# Patient Record
Sex: MALE | Race: WHITE | NOT HISPANIC OR LATINO | Employment: FULL TIME | URBAN - NONMETROPOLITAN AREA
[De-identification: names, ages, dates, MRNs, and addresses within clinical notes are randomized per-mention and may not be internally consistent; named-entity substitution may affect disease eponyms.]

---

## 2022-07-11 ENCOUNTER — OFFICE VISIT (OUTPATIENT)
Dept: UROLOGY | Facility: CLINIC | Age: 47
End: 2022-07-11

## 2022-07-11 VITALS
RESPIRATION RATE: 19 BRPM | BODY MASS INDEX: 27.3 KG/M2 | TEMPERATURE: 97.7 F | HEIGHT: 71 IN | HEART RATE: 68 BPM | DIASTOLIC BLOOD PRESSURE: 76 MMHG | OXYGEN SATURATION: 98 % | WEIGHT: 195 LBS | SYSTOLIC BLOOD PRESSURE: 122 MMHG

## 2022-07-11 DIAGNOSIS — Z30.09 VASECTOMY EVALUATION: Primary | ICD-10-CM

## 2022-07-11 PROCEDURE — 99203 OFFICE O/P NEW LOW 30 MIN: CPT | Performed by: UROLOGY

## 2022-07-11 NOTE — PROGRESS NOTES
"Chief Complaint  Elective sterilization    Referring provider  Referring, Self    HPI  Luis E Adiel is a 47 y.o. male who presents with desire for irreversible, elective sterilization.    He has 2 kids.    His wife is supportive of this decision.        Past Medical History  Past Medical History:   Diagnosis Date   • Hyperlipidemia        Past Surgical History  No past surgical history on file.    Medications  No current outpatient medications on file.    Allergies  No Known Allergies    Social History  Social History     Socioeconomic History   • Marital status:    Tobacco Use   • Smoking status: Never Smoker   • Smokeless tobacco: Never Used   Substance and Sexual Activity   • Alcohol use: Never   • Drug use: Never   • Sexual activity: Defer       Family History  He has no family history of prostate cancer    Review of Systems  Constitutional: Negative for fever, chills, weight loss, weight gain and malaise/fatigue.   Skin: Negative for rash.   Eyes: Negative for blurred vision.   Endocrine: No heat/cold intolerance   Cardiovascular: Negative for chest pain or dyspnea on exertion.  Respiratory: Negative for shortness of breath or wheezing.   Gastrointestinal: Negative for nausea, abdominal pain, diarrhea, constipation.   Genitourinary: Negative for new lower urinary tract symptoms, current gross hematuria or dysuria.  Musculoskeletal: Negative for back pain and joint pain.   Lymph/Heme: Negative for easily bruises / bleeds.     Physical Exam  Visit Vitals  /76   Pulse 68   Temp 97.7 °F (36.5 °C)   Resp 19   Ht 180.3 cm (71\")   Wt 88.5 kg (195 lb)   SpO2 98%   BMI 27.20 kg/m²     Constitutional: NAD, WDWN.   HEENT: NCAT. Conjunctivae normal.  MMM.    Cardiovascular: Regular rate.  Pulmonary/Chest: Respirations are even and non-labored bilaterally.  Abdominal: Soft. No distension, tenderness, masses or guarding. No CVA tenderness.  Neurological: A + O x 3.  Cranial Nerves II-XII grossly intact. Normal " gait.  Extremities: ANUM x 4, Warm. No clubbing.  No cyanosis.    Skin: Pink, warm and dry.  No rashes noted.  Psychiatric:  Normal mood and affect  Genitourinary  Penis: circumcised penis, glans normal, no penile discharge.  No rashes/lesions.    Testes: descended bilaterally, no masses, nontender to palpation. Remainder of scrotal contents normal. No hernia appreciated.  Bilateral vasa palpable    Assessment and Plan  Mr. Chun is a 47 y.o. male who presents today requesting permanent, irreversible surgical sterilization.  He was instructed to trim his pubic hair the night prior with a clippers.  The vasectomy was discussed in detail with the patient today including the risks which are failure of the procedure, infection, bleeding, development of chronic testicular pain and the possibility of injuring adjacent structures which could potentially result in loss of the testicle.  Furthermore, the patient was told he would remain fertile following the procedure until he provided a semen analysis that showed no sperm.  The patient expressed understanding and desire to proceed.      He will be scheduled for vasectomy with nitrous at his convenience. He was given a prescription for diclofenac to take 1 hr beforehand.     I spent a total of 30 minutes in total patient care, which involved direct interaction, examination, chart review, and discussion of treatment options.

## 2022-10-21 ENCOUNTER — PROCEDURE VISIT (OUTPATIENT)
Dept: UROLOGY | Facility: CLINIC | Age: 47
End: 2022-10-21

## 2022-10-21 DIAGNOSIS — Z30.2 ENCOUNTER FOR VASECTOMY: Primary | ICD-10-CM

## 2022-10-21 PROCEDURE — 55250 REMOVAL OF SPERM DUCT(S): CPT | Performed by: UROLOGY

## 2022-10-21 NOTE — PROGRESS NOTES
Preoperative diagnosis  Elective sterilization    Postoperative diagnosis  Elective sterilization    Procedure performed  Bilateral vasectomy    Surgeon  Garry Leone MD    Anesthesia  5 mL lidocaine 2% local injection  50% nitrous oxide /oxygen mixture    Complications  None    EBL  2 mL    Specimen  Left vas  Right vas    Indications  47 y.o. male agreed to undergo the above named procedure after discussion of the alternatives, risks and benefits.  Informed consent was obtained.      Procedure  The patient was brought to the procedure room and placed in supine position.  His scrotum was prepped with Hibiclens and he was draped in a sterile fashion.  A timeout was performed.  The patient was given inhaled self-administered nitrous oxide for anesthesia.    Lidocaine 2% was used to anesthetize the scrotal skin as well as perivasal sheaths using a high-pressure jet injector. A 1 cm skin incision was created with the sharp-tip mosquito and a vas clamp utilized through this incision to grasp the vas.  The left vas was elevated to the skin surface and dissected free of its perivasal sheath.  The vas was transected and the lumen was cauterized both proximally and distally.  A 4-0 chromic suture was utilized to place the proximal vasal portion under the perivasal sheath, such that the 2 ends were divided by the perivasal sheath (fascial interposition).  This portion of the vas was then allowed to drop back into the left hemiscrotum.  The right side was treated next in the same manner as described above.  The skin incision was closed with the 4-0 chromic suture.  The patient tolerated the procedure well.    It was reiterated to the patient that he would remain fertile for sometime and should present to the office for a post-vacectomy semen analysis to confirm sterility.  We will check a semen analysis in approximately 2 months. The patient expressed understanding.

## 2022-10-21 NOTE — PATIENT INSTRUCTIONS
Home Care after Vasectomy   Follow these guidelines for your care after your surgery to help your recovery.    Activity   Limit your activity for the first 5 days after surgery to light activity.   You may return to work in a day or so.   Limit lifting, pushing or pulling to less than 20 pounds for the next week. Also limit running and long walks.     Swelling  Scrotal swelling from the surgery may take weeks to get better. You should call your doctor if the swelling is severe and the scrotum is larger than an orange.   Use ice packs to the scrotum and penis for 15 minutes every hour for the first 48 hours when you are awake to limit swelling. Use a plastic bag with ice or a bag of frozen peas for the ice pack. Wrap a cloth or towel around the ice pack so the ice does not directly touch your skin.   Wear a jock strap or tight underwear for the next week to support your scrotum and reduce swelling.    Incision care   Stitches will dissolve and do not need to be removed.    Expect a small amount of blood may stain the dressings for up to 72 hours after surgery.    For the first few days, apply two or three gauze pads to the site each day and as needed to keep the dressing dry. This will protect the incision and help keep your clothes clean.   When you are no longer having any drainage, stop using the gauze pads over the site.    Bathing or showering   You may shower 48 to 72 hours after surgery.  Allow the water to wash over the incision but do not scrub the incision. Dry the site gently by patting it with a clean towel.    Tub baths should be avoided for 7 days after surgery.    Swimming should be avoided for 14 days after surgery.      Pain control  You will likely be sent home with a prescription for a few days of pain medicine. .  After 48 hours, most patients can take extra strength Tylenol (acetaminophen) or Advil (ibuprofen) for pain, following the label directions. Pain most often is eased after 5 to 7  days.  Take the pain medication 2x per day for the first 3 days, scheduled.     Sexual activity  You need to avoid ejaculating for 3 days after surgery.    Follow up  You should have a semen analysis performed in 8-12 weeks after your procedure to confirm sterility.  Use contraceptives until this is confirmed to prevent pregnancy.    When to call your doctor   Severe swelling, larger than the size of an orange    A large amount of fluid drainage that soaks several pads per day   Pain that is not controlled with pain medicine and use of ice packs   Any signs of infection such as:        Increased redness or tenderness around the incision site        Pus type drainage from the incision        Fever of greater than 101 degrees F    Other Contacts  Urology Office:  [address] [facility] [id number]   [place] [id number] [phone] office  [phone] fax

## 2022-10-24 LAB — REF LAB TEST METHOD: NORMAL

## 2022-12-22 LAB — SPERM - POST VASECTOMY: NORMAL

## 2022-12-22 PROCEDURE — 89321 SEMEN ANAL SPERM DETECTION: CPT | Performed by: UROLOGY
